# Patient Record
Sex: FEMALE | NOT HISPANIC OR LATINO
[De-identification: names, ages, dates, MRNs, and addresses within clinical notes are randomized per-mention and may not be internally consistent; named-entity substitution may affect disease eponyms.]

---

## 2020-06-15 PROBLEM — Z00.00 ENCOUNTER FOR PREVENTIVE HEALTH EXAMINATION: Status: ACTIVE | Noted: 2020-06-15

## 2020-06-16 ENCOUNTER — APPOINTMENT (OUTPATIENT)
Dept: OBGYN | Facility: CLINIC | Age: 85
End: 2020-06-16
Payer: MEDICARE

## 2020-06-16 VITALS
HEIGHT: 63 IN | DIASTOLIC BLOOD PRESSURE: 78 MMHG | SYSTOLIC BLOOD PRESSURE: 120 MMHG | TEMPERATURE: 97.8 F | WEIGHT: 154 LBS | HEART RATE: 60 BPM | OXYGEN SATURATION: 99 % | BODY MASS INDEX: 27.29 KG/M2

## 2020-06-16 DIAGNOSIS — Z78.9 OTHER SPECIFIED HEALTH STATUS: ICD-10-CM

## 2020-06-16 DIAGNOSIS — Z46.89 ENCOUNTER FOR FITTING AND ADJUSTMENT OF OTHER SPECIFIED DEVICES: ICD-10-CM

## 2020-06-16 DIAGNOSIS — M19.90 UNSPECIFIED OSTEOARTHRITIS, UNSPECIFIED SITE: ICD-10-CM

## 2020-06-16 DIAGNOSIS — N20.0 CALCULUS OF KIDNEY: ICD-10-CM

## 2020-06-16 DIAGNOSIS — Z86.69 PERSONAL HISTORY OF OTHER DISEASES OF THE NERVOUS SYSTEM AND SENSE ORGANS: ICD-10-CM

## 2020-06-16 DIAGNOSIS — M25.9 JOINT DISORDER, UNSPECIFIED: ICD-10-CM

## 2020-06-16 DIAGNOSIS — I24.0 ACUTE CORONARY THROMBOSIS NOT RESULTING IN MYOCARDIAL INFARCTION: ICD-10-CM

## 2020-06-16 DIAGNOSIS — Z87.448 PERSONAL HISTORY OF OTHER DISEASES OF URINARY SYSTEM: ICD-10-CM

## 2020-06-16 PROCEDURE — 99201 OFFICE OUTPATIENT NEW 10 MINUTES: CPT

## 2020-06-16 RX ORDER — GLUCOSAM/MSM/C/MAN/WILLOW/GING 500-333.3
TABLET ORAL
Refills: 0 | Status: ACTIVE | COMMUNITY

## 2020-06-16 RX ORDER — ACETAMINOPHEN 325 MG/10.15ML
325 SOLUTION ORAL
Refills: 0 | Status: ACTIVE | COMMUNITY

## 2020-06-16 RX ORDER — CHOLECALCIFEROL (VITAMIN D3) 125 MCG
5000 CAPSULE ORAL
Refills: 0 | Status: ACTIVE | COMMUNITY

## 2020-06-16 RX ORDER — ESTRADIOL 0.1 MG/G
0.1 CREAM VAGINAL
Refills: 0 | Status: ACTIVE | COMMUNITY

## 2020-06-16 RX ORDER — LATANOPROST/PF 0.005 %
0.01 DROPS OPHTHALMIC (EYE)
Refills: 0 | Status: ACTIVE | COMMUNITY

## 2020-06-16 RX ORDER — GLUCOSAMINE SULF/CHONDROITIN A 500-250 MG
500-250 CAPSULE ORAL
Refills: 0 | Status: ACTIVE | COMMUNITY

## 2020-06-16 RX ORDER — GARLIC 1000 MG
CAPSULE ORAL
Refills: 0 | Status: ACTIVE | COMMUNITY

## 2020-06-16 RX ORDER — SALICYLIC ACID 17 %
17 LIQUID (ML) TOPICAL
Refills: 0 | Status: ACTIVE | COMMUNITY

## 2020-06-16 RX ORDER — LEVOTHYROXINE SODIUM 88 UG/1
88 CAPSULE ORAL
Refills: 0 | Status: ACTIVE | COMMUNITY

## 2020-06-16 RX ORDER — ASPIRIN 81 MG
81 TABLET,CHEWABLE ORAL
Refills: 0 | Status: ACTIVE | COMMUNITY

## 2020-06-16 RX ORDER — PNV NO.95/FERROUS FUM/FOLIC AC 28MG-0.8MG
1000 TABLET ORAL
Refills: 0 | Status: ACTIVE | COMMUNITY

## 2020-06-16 RX ORDER — GLUCOSAMINE HCL/CHONDROITIN SU 500-400 MG
500-400 CAPSULE ORAL
Refills: 0 | Status: ACTIVE | COMMUNITY

## 2020-06-16 NOTE — PHYSICAL EXAM
[Awake] : awake [Alert] : alert [Appropriate] : appropriate [No Lesions] : no genitalia lesions [Normal Mental Status] : the patient was oriented to person, place and time [Atrophy] : atrophy [Vulvar Atrophy] : vulvar atrophy [Discharge] : a  ~M vaginal discharge was present [No Bleeding] : there was no active vaginal bleeding [Acute Distress] : no acute distress [Erythema] : no erythema